# Patient Record
Sex: FEMALE | Race: WHITE | NOT HISPANIC OR LATINO | URBAN - METROPOLITAN AREA
[De-identification: names, ages, dates, MRNs, and addresses within clinical notes are randomized per-mention and may not be internally consistent; named-entity substitution may affect disease eponyms.]

---

## 2017-04-28 ENCOUNTER — IMPORTED ENCOUNTER (OUTPATIENT)
Dept: URBAN - METROPOLITAN AREA CLINIC 50 | Facility: CLINIC | Age: 72
End: 2017-04-28

## 2018-01-26 ENCOUNTER — IMPORTED ENCOUNTER (OUTPATIENT)
Dept: URBAN - METROPOLITAN AREA CLINIC 50 | Facility: CLINIC | Age: 73
End: 2018-01-26

## 2018-06-19 ENCOUNTER — IMPORTED ENCOUNTER (OUTPATIENT)
Dept: URBAN - METROPOLITAN AREA CLINIC 50 | Facility: CLINIC | Age: 73
End: 2018-06-19

## 2019-05-21 ENCOUNTER — IMPORTED ENCOUNTER (OUTPATIENT)
Dept: URBAN - METROPOLITAN AREA CLINIC 50 | Facility: CLINIC | Age: 74
End: 2019-05-21

## 2019-06-03 ENCOUNTER — IMPORTED ENCOUNTER (OUTPATIENT)
Dept: URBAN - METROPOLITAN AREA CLINIC 50 | Facility: CLINIC | Age: 74
End: 2019-06-03

## 2019-06-03 NOTE — PATIENT DISCUSSION
"""offered cataract surgery. patient does not want at this time.  discussed with patient that a pair ""

## 2020-03-05 ENCOUNTER — IMPORTED ENCOUNTER (OUTPATIENT)
Dept: URBAN - METROPOLITAN AREA CLINIC 50 | Facility: CLINIC | Age: 75
End: 2020-03-05

## 2020-06-08 ENCOUNTER — IMPORTED ENCOUNTER (OUTPATIENT)
Dept: URBAN - METROPOLITAN AREA CLINIC 50 | Facility: CLINIC | Age: 75
End: 2020-06-08

## 2020-06-08 NOTE — PATIENT DISCUSSION
"""D/w patient triggers and signs/symptoms.  Patient given a brochure today for more information. """

## 2021-04-17 ASSESSMENT — VISUAL ACUITY
OS_OTHER: 20/40. 20/50.
OD_OTHER: 20/20. 20/25.
OD_SC: 20/20-1
OS_SC: 20/40
OS_PH: 20/25
OS_CC: J2
OS_SC: 20/200
OD_OTHER: 20/25. 20/40.
OD_OTHER: 20/50. 20/60.
OS_OTHER: 20/60. 20/80.
OS_PH: 20/30
OS_PH: @ 19 IN
OS_BAT: 20/70
OS_SC: 20/40
OD_SC: 20/25
OD_BAT: 20/25
OS_PH: 20/25
OD_OTHER: 20/25. 20/30.
OS_OTHER: 20/<400.
OS_BAT: 20/60
OD_CC: J1-@ 19 IN
OD_CC: J2
OS_PH: 20/60
OS_SC: 20/80-1
OS_BAT: 20/<400
OD_SC: 20/40
OD_BAT: 20/20
OS_SC: 20/80+2
OD_PH: 20/30-2
OS_PH: 20/30+1
OD_SC: 20/25
OD_BAT: 20/50
OS_BAT: 20/40
OS_CC: J1@ 16 IN
OD_SC: 20/25
OD_CC: J1@ 16 IN
OD_BAT: 20/25
OS_CC: J1-@ 19 IN
OS_OTHER: 20/70. 20/80.

## 2021-04-17 ASSESSMENT — TONOMETRY
OD_IOP_MMHG: 15
OS_IOP_MMHG: 17
OD_IOP_MMHG: 17
OD_IOP_MMHG: 16
OS_IOP_MMHG: 17
OS_IOP_MMHG: 16
OS_IOP_MMHG: 14
OD_IOP_MMHG: 14
OD_IOP_MMHG: 17
OS_IOP_MMHG: 14

## 2021-08-06 ENCOUNTER — PREPPED CHART (OUTPATIENT)
Dept: URBAN - METROPOLITAN AREA CLINIC 49 | Facility: CLINIC | Age: 76
End: 2021-08-06

## 2021-08-06 NOTE — PATIENT DISCUSSION
"""D/w patient triggers and signs/symptoms.  Patient given a brochure today for more information. ""."

## 2021-08-11 ENCOUNTER — ROUTINE EXAM (OUTPATIENT)
Dept: URBAN - METROPOLITAN AREA CLINIC 49 | Facility: CLINIC | Age: 76
End: 2021-08-11

## 2021-08-11 DIAGNOSIS — H25.13: ICD-10-CM

## 2021-08-11 DIAGNOSIS — H35.363: ICD-10-CM

## 2021-08-11 PROCEDURE — 92014 COMPRE OPH EXAM EST PT 1/>: CPT

## 2021-08-11 PROCEDURE — 92134 CPTRZ OPH DX IMG PST SGM RTA: CPT

## 2021-08-11 ASSESSMENT — VISUAL ACUITY
OS_SC: 20/150
OD_SC: 20/30
OD_GLARE: 20/30
OS_GLARE: 20/150
OD_GLARE: 20/30
OS_GLARE: 20/150
OS_PH: 20/60
OU_SC: J2

## 2021-08-11 ASSESSMENT — TONOMETRY
OD_IOP_MMHG: 16
OS_IOP_MMHG: 17

## 2022-08-03 ENCOUNTER — COMPREHENSIVE EXAM (OUTPATIENT)
Dept: URBAN - METROPOLITAN AREA CLINIC 49 | Facility: CLINIC | Age: 77
End: 2022-08-03

## 2022-08-03 DIAGNOSIS — H35.363: ICD-10-CM

## 2022-08-03 DIAGNOSIS — Z01.01: ICD-10-CM

## 2022-08-03 PROCEDURE — 92014 COMPRE OPH EXAM EST PT 1/>: CPT

## 2022-08-03 PROCEDURE — 92015 DETERMINE REFRACTIVE STATE: CPT

## 2022-08-03 PROCEDURE — 92134 CPTRZ OPH DX IMG PST SGM RTA: CPT

## 2022-08-03 ASSESSMENT — VISUAL ACUITY
OD_GLARE: 20/50-1
OS_SC: 20/400
OS_GLARE: 20/400
OS_PH: 20/100+2
OD_GLARE: 20/25
OD_SC: 20/30-1
OU_SC: J2

## 2022-08-03 ASSESSMENT — TONOMETRY
OD_IOP_MMHG: 16
OS_IOP_MMHG: 16

## 2022-08-03 NOTE — PATIENT DISCUSSION
Explained to the patient that there are a few things about her features that lean towards Parkinson's Disease. She has developed a new tremor recently, has fallen a few times recently and doesn't blink often. Recommended patient to mention it to her PCP when she sees them next so they can check to see if she has Parkinson's Disease.

## 2022-08-03 NOTE — PATIENT DISCUSSION
Patient states she is not diabetic or pre diabetic. She also says that her blood pressure has been under control.

## 2022-10-03 ENCOUNTER — ESTABLISHED PATIENT (OUTPATIENT)
Dept: URBAN - METROPOLITAN AREA CLINIC 49 | Facility: CLINIC | Age: 77
End: 2022-10-03

## 2022-10-03 DIAGNOSIS — H35.62: ICD-10-CM

## 2022-10-03 DIAGNOSIS — H35.363: ICD-10-CM

## 2022-10-03 PROCEDURE — 92014 COMPRE OPH EXAM EST PT 1/>: CPT

## 2022-10-03 PROCEDURE — 92134 CPTRZ OPH DX IMG PST SGM RTA: CPT

## 2022-10-03 ASSESSMENT — TONOMETRY
OS_IOP_MMHG: 12
OD_IOP_MMHG: 12

## 2022-10-03 ASSESSMENT — VISUAL ACUITY
OS_PH: 20/80
OD_SC: 20/30-1
OS_SC: 20/200-1

## 2022-10-03 NOTE — PATIENT DISCUSSION
Advised patient that the hemorrhage has resolved, not seen in OCT or exam today. Recommended she maintains regular visits with PCP, diabetes is usually the cause of hemorrhages.

## 2024-03-25 ENCOUNTER — COMPREHENSIVE EXAM (OUTPATIENT)
Dept: URBAN - METROPOLITAN AREA CLINIC 49 | Facility: LOCATION | Age: 79
End: 2024-03-25

## 2024-03-25 DIAGNOSIS — H35.363: ICD-10-CM

## 2024-03-25 DIAGNOSIS — H52.4: ICD-10-CM

## 2024-03-25 DIAGNOSIS — H35.373: ICD-10-CM

## 2024-03-25 DIAGNOSIS — H25.13: ICD-10-CM

## 2024-03-25 PROCEDURE — 92134 CPTRZ OPH DX IMG PST SGM RTA: CPT

## 2024-03-25 PROCEDURE — 92014 COMPRE OPH EXAM EST PT 1/>: CPT

## 2024-03-25 ASSESSMENT — VISUAL ACUITY
OS_SC: J5+2
OU_SC: J1
OD_SC: J1-2
OS_GLARE: BCVA >20/50
OS_CC: J2
OD_GLARE: 20/40
OU_SC: 20/40+1
OD_SC: 20/40
OS_SC: 20/200-1
OU_CC: J1
OD_CC: 20/30+1
OD_GLARE: 20/60
OS_CC: 20/200-1
OU_CC: 20/25-2
OD_CC: J2-2

## 2024-03-25 ASSESSMENT — TONOMETRY
OS_IOP_MMHG: 14
OD_IOP_MMHG: 13

## 2024-12-09 ENCOUNTER — CONSULTATION/EVALUATION (OUTPATIENT)
Age: 79
End: 2024-12-09

## 2024-12-09 DIAGNOSIS — G20: ICD-10-CM

## 2024-12-09 DIAGNOSIS — H25.813: ICD-10-CM

## 2024-12-09 DIAGNOSIS — H35.363: ICD-10-CM

## 2024-12-09 DIAGNOSIS — G43.B0: ICD-10-CM

## 2024-12-09 PROCEDURE — 92134 CPTRZ OPH DX IMG PST SGM RTA: CPT

## 2024-12-09 PROCEDURE — 99214 OFFICE O/P EST MOD 30 MIN: CPT

## 2024-12-09 PROCEDURE — 92015 DETERMINE REFRACTIVE STATE: CPT

## 2025-01-20 ENCOUNTER — PRE-OP/H&P (OUTPATIENT)
Age: 80
End: 2025-01-20

## 2025-01-20 DIAGNOSIS — H35.363: ICD-10-CM

## 2025-01-20 DIAGNOSIS — H25.813: ICD-10-CM

## 2025-01-20 PROCEDURE — 92134 CPTRZ OPH DX IMG PST SGM RTA: CPT

## 2025-01-20 PROCEDURE — PREOP PRE OP VISIT

## 2025-01-20 PROCEDURE — 92136 OPHTHALMIC BIOMETRY: CPT

## 2025-01-20 PROCEDURE — 92025-3 CORNEAL TOPO, REFUSED

## 2025-02-04 ENCOUNTER — SURGERY/PROCEDURE (OUTPATIENT)
Age: 80
End: 2025-02-04

## 2025-02-04 ENCOUNTER — POST-OP (OUTPATIENT)
Age: 80
End: 2025-02-04

## 2025-02-04 DIAGNOSIS — Z96.1: ICD-10-CM

## 2025-02-04 DIAGNOSIS — Z98.42: ICD-10-CM

## 2025-02-04 DIAGNOSIS — H25.813: ICD-10-CM

## 2025-02-04 PROCEDURE — 99199PCV CUSTOM VISION

## 2025-02-04 PROCEDURE — 66984CV REMOVE CATARACT, INSERT LENS, CUSTOM VISION

## 2025-02-10 ENCOUNTER — POST-OP (OUTPATIENT)
Age: 80
End: 2025-02-10

## 2025-02-10 DIAGNOSIS — Z98.42: ICD-10-CM

## 2025-02-10 DIAGNOSIS — Z96.1: ICD-10-CM

## 2025-02-21 ENCOUNTER — EMERGENCY VISIT (OUTPATIENT)
Age: 80
End: 2025-02-21

## 2025-02-21 DIAGNOSIS — Z96.1: ICD-10-CM

## 2025-02-21 DIAGNOSIS — H04.123: ICD-10-CM

## 2025-02-21 PROCEDURE — 99213 OFFICE O/P EST LOW 20 MIN: CPT

## 2025-02-21 RX ORDER — BUTALBITAL, ASPIRIN, CAFFEINE AND CODEINE PHOSPHATE 30; 50; 40; 325 MG/1; MG/1; MG/1; MG/1
1 CAPSULE ORAL ONCE A DAY
Start: 2025-02-21

## 2025-02-28 ENCOUNTER — EMERGENCY VISIT (OUTPATIENT)
Age: 80
End: 2025-02-28

## 2025-02-28 DIAGNOSIS — S05.02XA: ICD-10-CM

## 2025-02-28 PROCEDURE — 99212 OFFICE O/P EST SF 10 MIN: CPT

## 2025-03-03 ENCOUNTER — FOLLOW UP (OUTPATIENT)
Age: 80
End: 2025-03-03

## 2025-03-03 DIAGNOSIS — H25.811: ICD-10-CM

## 2025-03-03 DIAGNOSIS — H04.123: ICD-10-CM

## 2025-03-03 DIAGNOSIS — S05.02XS: ICD-10-CM

## 2025-03-03 PROCEDURE — 99213 OFFICE O/P EST LOW 20 MIN: CPT

## 2025-03-31 ENCOUNTER — POST-OP (OUTPATIENT)
Age: 80
End: 2025-03-31

## 2025-03-31 DIAGNOSIS — Z98.42: ICD-10-CM

## 2025-03-31 DIAGNOSIS — Z96.1: ICD-10-CM

## 2025-03-31 PROCEDURE — 99024 POSTOP FOLLOW-UP VISIT: CPT
